# Patient Record
Sex: MALE | Race: BLACK OR AFRICAN AMERICAN | NOT HISPANIC OR LATINO | URBAN - METROPOLITAN AREA
[De-identification: names, ages, dates, MRNs, and addresses within clinical notes are randomized per-mention and may not be internally consistent; named-entity substitution may affect disease eponyms.]

---

## 2017-05-06 ENCOUNTER — EMERGENCY (EMERGENCY)
Facility: HOSPITAL | Age: 47
LOS: 1 days | Discharge: LEFT BEFORE DISCHARGE | End: 2017-05-06
Admitting: EMERGENCY MEDICINE

## 2017-05-06 VITALS
DIASTOLIC BLOOD PRESSURE: 93 MMHG | RESPIRATION RATE: 18 BRPM | SYSTOLIC BLOOD PRESSURE: 135 MMHG | HEIGHT: 66 IN | WEIGHT: 151.9 LBS | OXYGEN SATURATION: 96 % | HEART RATE: 99 BPM | TEMPERATURE: 99 F

## 2017-05-06 DIAGNOSIS — M79.673 PAIN IN UNSPECIFIED FOOT: ICD-10-CM

## 2017-05-20 ENCOUNTER — EMERGENCY (EMERGENCY)
Facility: HOSPITAL | Age: 47
LOS: 1 days | Discharge: HOME | End: 2017-05-20
Attending: EMERGENCY MEDICINE | Admitting: EMERGENCY MEDICINE
Payer: SELF-PAY

## 2017-05-20 VITALS
HEIGHT: 63 IN | HEART RATE: 96 BPM | OXYGEN SATURATION: 97 % | SYSTOLIC BLOOD PRESSURE: 101 MMHG | RESPIRATION RATE: 17 BRPM | WEIGHT: 145.06 LBS | TEMPERATURE: 98 F | DIASTOLIC BLOOD PRESSURE: 70 MMHG

## 2017-05-20 DIAGNOSIS — R07.89 OTHER CHEST PAIN: ICD-10-CM

## 2017-05-20 DIAGNOSIS — Z88.1 ALLERGY STATUS TO OTHER ANTIBIOTIC AGENTS STATUS: ICD-10-CM

## 2017-05-20 DIAGNOSIS — J45.909 UNSPECIFIED ASTHMA, UNCOMPLICATED: ICD-10-CM

## 2017-05-20 DIAGNOSIS — T73.0XXA STARVATION, INITIAL ENCOUNTER: ICD-10-CM

## 2017-05-20 DIAGNOSIS — Z88.8 ALLERGY STATUS TO OTHER DRUGS, MEDICAMENTS AND BIOLOGICAL SUBSTANCES: ICD-10-CM

## 2017-05-20 DIAGNOSIS — Z88.0 ALLERGY STATUS TO PENICILLIN: ICD-10-CM

## 2017-05-20 DIAGNOSIS — Z91.013 ALLERGY TO SEAFOOD: ICD-10-CM

## 2017-05-20 DIAGNOSIS — I10 ESSENTIAL (PRIMARY) HYPERTENSION: ICD-10-CM

## 2017-05-20 PROCEDURE — 99053 MED SERV 10PM-8AM 24 HR FAC: CPT

## 2017-05-20 PROCEDURE — 93010 ELECTROCARDIOGRAM REPORT: CPT

## 2017-05-20 PROCEDURE — 99284 EMERGENCY DEPT VISIT MOD MDM: CPT | Mod: 25

## 2017-05-20 RX ORDER — LIDOCAINE 4 G/100G
15 CREAM TOPICAL ONCE
Refills: 0 | Status: DISCONTINUED | OUTPATIENT
Start: 2017-05-20 | End: 2017-05-20

## 2017-05-20 RX ORDER — FAMOTIDINE 10 MG/ML
20 INJECTION INTRAVENOUS ONCE
Refills: 0 | Status: DISCONTINUED | OUTPATIENT
Start: 2017-05-20 | End: 2017-05-20

## 2017-05-20 NOTE — ED PROVIDER NOTE - MEDICAL DECISION MAKING DETAILS
patient with non-specific recurrent chest pain x 5h. Demanding narcotic pain meds and is refusing to stay unless he gets them. will not accept ED BATISTA or GI cocktail or NSAIDS/APAP. Will d/c. EKG wnl.

## 2017-05-20 NOTE — ED PROVIDER NOTE - OBJECTIVE STATEMENT
47 M here with chest pain (has had this before)- no shortness of breath or f/c. No cough. No abdo pain. He is homeless and just came to [facility] 1 month ago for  [place]. + heavy MJ user and drinks 2 x 24oz beers every few days. Very poor historian and low health literacy. Hungry now. No staying in the shelter system but goes to a drop in center somewhere on [address].  + smokes 1.5 ppd. Also feels like his asthma is acting up. No BP meds x 8 mos. When he gets his month Monday he plans t take a bus back to [place]. 47 M here with chest pain (has had this before)- no shortness of breath or f/c. No cough. No abdo pain. He is homeless and just came to [facility] 1 month ago for  [place]. + heavy MJ user and drinks 2 x 24oz beers every few days. Very poor historian and low health literacy. Hungry now. No staying in the shelter system but goes to a drop in center somewhere on [address].  + smokes 1.5 ppd. Also feels like his asthma is acting up. No BP meds x 8 mos. When he gets his month Monday he plans t take a bus back to [place]. Patient has multiple old hospital bracelets on.

## 2023-02-15 NOTE — ED ADULT TRIAGE NOTE - WEIGHT IN KG
Problem: Discharge Planning  Goal: Discharge to home or other facility with appropriate resources  2/15/2023 0038 by Khoa Garber RN  Outcome: Progressing  Flowsheets (Taken 2/15/2023 0038)  Discharge to home or other facility with appropriate resources:   Identify barriers to discharge with patient and caregiver   Arrange for needed discharge resources and transportation as appropriate   Identify discharge learning needs (meds, wound care, etc)   Refer to discharge planning if patient needs post-hospital services based on physician order or complex needs related to functional status, cognitive ability or social support system     Problem: Pain  Goal: Verbalizes/displays adequate comfort level or baseline comfort level  2/15/2023 0038 by Khoa Garber RN  Outcome: Progressing  Flowsheets (Taken 2/15/2023 0038)  Verbalizes/displays adequate comfort level or baseline comfort level:   Encourage patient to monitor pain and request assistance   Assess pain using appropriate pain scale   Administer analgesics based on type and severity of pain and evaluate response   Implement non-pharmacological measures as appropriate and evaluate response     Problem: Skin/Tissue Integrity  Goal: Absence of new skin breakdown  Description: 1. Monitor for areas of redness and/or skin breakdown  2. Assess vascular access sites hourly  3. Every 4-6 hours minimum:  Change oxygen saturation probe site  4. Every 4-6 hours:  If on nasal continuous positive airway pressure, respiratory therapy assess nares and determine need for appliance change or resting period. 2/15/2023 0038 by Khoa Garber RN  Outcome: Progressing  Note: Q2 turn and incontinence care, zinc oxide applied, skin assessment in flowsheets.         Problem: Cardiovascular - Adult  Goal: Maintains optimal cardiac output and hemodynamic stability  2/15/2023 0038 by Khoa Garber RN  Outcome: Progressing  Flowsheets (Taken 2/15/2023 0038)  Maintains optimal cardiac output and hemodynamic stability:   Monitor blood pressure and heart rate   Monitor urine output and notify Licensed Independent Practitioner for values outside of normal range   Assess for signs of decreased cardiac output   Administer fluid and/or volume expanders as ordered   Administer vasoactive medications as ordered     Problem: Cardiovascular - Adult  Goal: Absence of cardiac dysrhythmias or at baseline  2/15/2023 0038 by Aurelio Perez RN  Outcome: Progressing  Flowsheets (Taken 2/15/2023 0038)  Absence of cardiac dysrhythmias or at baseline:   Monitor cardiac rate and rhythm   Assess for signs of decreased cardiac output   Administer antiarrhythmia medication and electrolyte replacement as ordered     Problem: Metabolic/Fluid and Electrolytes - Adult  Goal: Electrolytes maintained within normal limits  2/15/2023 0038 by Aurelio Perez RN  Outcome: Progressing  Flowsheets (Taken 2/15/2023 0038)  Electrolytes maintained within normal limits:   Monitor labs and assess patient for signs and symptoms of electrolyte imbalances   Administer electrolyte replacement as ordered   Monitor response to electrolyte replacements, including repeat lab results as appropriate     Problem: Metabolic/Fluid and Electrolytes - Adult  Goal: Hemodynamic stability and optimal renal function maintained  2/15/2023 0038 by Aurelio Perez RN  Outcome: Progressing  Flowsheets (Taken 2/15/2023 0038)  Hemodynamic stability and optimal renal function maintained:   Monitor labs and assess for signs and symptoms of volume excess or deficit   Monitor intake, output and patient weight   Monitor response to interventions for patient's volume status, including labs, urine output, blood pressure (other measures as available)     Problem: Metabolic/Fluid and Electrolytes - Adult  Goal: Glucose maintained within prescribed range  2/15/2023 0038 by Aurelio Perez RN  Outcome: Progressing  Flowsheets (Taken 2/15/2023 0038)  Glucose maintained within prescribed range:   Monitor blood glucose as ordered   Assess for signs and symptoms of hyperglycemia and hypoglycemia   Administer ordered medications to maintain glucose within target range     Problem: Hematologic - Adult  Goal: Maintains hematologic stability  2/15/2023 0038 by Eldon Helm RN  Outcome: Progressing  Flowsheets (Taken 2/15/2023 0038)  Maintains hematologic stability:   Assess for signs and symptoms of bleeding or hemorrhage   Monitor labs for bleeding or clotting disorders     Problem: ABCDS Injury Assessment  Goal: Absence of physical injury  2/15/2023 0038 by Eldon Helm RN  Outcome: Progressing  Flowsheets (Taken 2/15/2023 0038)  Absence of Physical Injury: Implement safety measures based on patient assessment     Problem: Safety - Adult  Goal: Free from fall injury  2/15/2023 0038 by Eldon Helm RN  Outcome: Progressing  Flowsheets (Taken 2/15/2023 0038)  Free From Fall Injury: Instruct family/caregiver on patient safety     Problem: Nutrition Deficit:  Goal: Optimize nutritional status  2/15/2023 0038 by Eldon Helm RN  Outcome: Progressing  Flowsheets (Taken 2/15/2023 0038)  Nutrient intake appropriate for improving, restoring, or maintaining nutritional needs: Monitor oral intake, labs, and treatment plans   Care plan reviewed with patient. Patient verbalizes understanding of the plan of care and contribute to goal setting. 68.9

## 2025-04-24 NOTE — ED PROVIDER NOTE - CROS ED CONS ALL NEG
Advance Care Planning   Ambulatory ACP Specialist Patient Outreach    Date:  4/24/2025    ACP Specialist:  CANDACE Nugent    Outreach call to patient in follow-up to ACP Specialist referral from:Ginger Anguiano MD    [x] PCP  [] Provider   [] Ambulatory Care Management [] Other     For:                  [x] Advance Directive Assistance              [] Complete Portable DNR order              [] Complete POST/POLST/MOST              [] Code Status Discussion             [] Discuss Goals of Care             [] Early ACP Decision-Making              [] Other (Specify)    Date Referral Received: 04/11/2025    Next Step:   [] ACP scheduled conversation  [x] Outreach again in one week               [] Email / Mail ACP Info Sheets  [] Email / Mail Advance Directive   [] Closing referral.  Routing closure to referring provider/staff and to ACP Specialist .    [] Closure letter mailed to patient with invitation to contact ACP Specialist if / when ready.   [] Other (Specify here):       [x] At this time, Healthcare Decision Maker Is:         [] Primary agent named in scanned advance directive.    [x] Legal Next of Kin.     [] Unable to determine legal decision maker at this time.    Outreaches:       [x] 1st -  Date:   04/24/2025               Intervention:  [] Spoke with Patient   [x] Left Voice mail [] Email / Mail    [] Bookyahart  [] Other (Specify) :     Outcomes: ACP specialist made pt's scheduled phone call to his listed home/mobile number reflecting [phone]. Pt didn't answer this call, resulting in a VM left encouraging a return call if he would like to re-schedule this appt. If no return call is rcvd within 1 week, a f/u call will be made.            [] 2nd -  Date:                 Intervention:  [] Spoke with Patient  [] Left Voice mail [] Email / Mail    [] Bookyahart  [] Other (Specify) :              Outcomes:                [] 3rd -  Date:                Intervention:  [] Spoke with Patient   
negative...